# Patient Record
Sex: MALE | Race: WHITE | NOT HISPANIC OR LATINO | URBAN - METROPOLITAN AREA
[De-identification: names, ages, dates, MRNs, and addresses within clinical notes are randomized per-mention and may not be internally consistent; named-entity substitution may affect disease eponyms.]

---

## 2017-02-06 ENCOUNTER — OFFICE VISIT - RIVER FALLS (OUTPATIENT)
Dept: FAMILY MEDICINE | Facility: CLINIC | Age: 22
End: 2017-02-06

## 2018-07-11 ENCOUNTER — TELEPHONE (OUTPATIENT)
Dept: OTHER | Facility: CLINIC | Age: 23
End: 2018-07-11

## 2022-02-11 VITALS
DIASTOLIC BLOOD PRESSURE: 58 MMHG | TEMPERATURE: 98.9 F | WEIGHT: 165 LBS | SYSTOLIC BLOOD PRESSURE: 110 MMHG | HEART RATE: 72 BPM | BODY MASS INDEX: 23.85 KG/M2

## 2022-02-16 NOTE — PROGRESS NOTES
Patient:   JEFRY MONTIEL            MRN: [id number]            FIN: [id number]               Age:   22 years     Sex:  Male     :  1995   Associated Diagnoses:   Exposure to STD   Author:   Harpreet Morales MD      Visit Information      Date of Service: 2017 06:27 pm  Performing Location: [facility]  Encounter#: [id number]      Primary Care Provider (PCP):  97 -UNKNOWN,      Referring Provider:  No referring provider recorded for selected visit.      Chief Complaint   2017 6:41 PM CST     STD check - partner tested positive for chlamydia        History of Present Illness   Female intermittent sexual partner notified him of positive chlamydia test. Interested in STD testing including HIV, hepatitis C, Syhpilis, chlamydia and gonorrhea. Has had about 10 partners lifetime.      Review of Systems   Constitutional:  No fever.    Gastrointestinal:  No vomiting.    Genitourinary:  No dysuria, No urethral discharge.       Health Status   Allergies:    Allergic Reactions (Selected)  No known allergies   Medications:  (Selected)   Prescriptions  Prescribed  clobetasol 0.05% topical cream: 1 j carlos, TOP, BID, # 45 gm, 1 Refill(s), Type: Maintenance, Pharmacy: [facility], 1 j carlos top bid   Problem list:    No problem items selected or recorded.      Histories   Social History: [employer] student      Physical Examination   General:  Alert and oriented, No acute distress.    Genitourinary:  No inguinal tenderness, No urethral discharge, No lesions.    Musculoskeletal:  Normal gait.       Impression and Plan   Diagnosis     Exposure to STD ([id number] Z20.2).     Treat with zithromax and test for chlamydia/gonorrhea, HIV, hepatitis C and Syphilis